# Patient Record
Sex: MALE | Race: WHITE | URBAN - METROPOLITAN AREA
[De-identification: names, ages, dates, MRNs, and addresses within clinical notes are randomized per-mention and may not be internally consistent; named-entity substitution may affect disease eponyms.]

---

## 2024-08-12 ENCOUNTER — TELEPHONE (OUTPATIENT)
Dept: DERMATOLOGY | Age: 74
End: 2024-08-12

## 2024-08-12 NOTE — TELEPHONE ENCOUNTER
Patient wants to be establish with Dr. Abarca because his friend a current patient.   Would doctor take him as a new patient.  I told him we were not accepting new patients at this time.    [phone]

## 2024-08-14 NOTE — TELEPHONE ENCOUNTER
Unfortunately, [facility] does not have the capacity to accept new patients.   A new patient is considered someone who has not been seen by a provider within 3 years.    [facility] ([place], [place], [place], [place]) ([facility] the [facility]): [phone]    [facility]): [phone]    [facility] ([place], [place], [place], [place], [place], [place]): [phone]    [facility]): [phone]    [facility] ([place]): [phone]    [facility] ([place]):  [phone]  *Has a pediatric trained MD    [facility]): [phone]    Thank you for your understanding,   [facility]